# Patient Record
Sex: MALE | Race: WHITE | NOT HISPANIC OR LATINO | URBAN - METROPOLITAN AREA
[De-identification: names, ages, dates, MRNs, and addresses within clinical notes are randomized per-mention and may not be internally consistent; named-entity substitution may affect disease eponyms.]

---

## 2020-07-25 ENCOUNTER — TELEPHONE ENCOUNTER (OUTPATIENT)
Dept: URBAN - METROPOLITAN AREA CLINIC 13 | Facility: CLINIC | Age: 55
End: 2020-07-25

## 2020-07-25 RX ORDER — OMEPRAZOLE 40 MG/1
TAKE 1 CAPSULE DAILY CAPSULE, DELAYED RELEASE ORAL
Qty: 1 | Refills: 11 | OUTPATIENT
End: 2016-10-13

## 2020-07-26 ENCOUNTER — TELEPHONE ENCOUNTER (OUTPATIENT)
Dept: URBAN - METROPOLITAN AREA CLINIC 13 | Facility: CLINIC | Age: 55
End: 2020-07-26

## 2020-07-26 RX ORDER — ALPRAZOLAM 0.5 MG
TAKE 1 TABLET DAILY TABLET ORAL
Refills: 0 | Status: ACTIVE | COMMUNITY

## 2021-08-25 ENCOUNTER — WEB ENCOUNTER (OUTPATIENT)
Dept: URBAN - METROPOLITAN AREA CLINIC 113 | Facility: CLINIC | Age: 56
End: 2021-08-25

## 2021-08-25 ENCOUNTER — DASHBOARD ENCOUNTERS (OUTPATIENT)
Age: 56
End: 2021-08-25

## 2021-08-25 ENCOUNTER — OFFICE VISIT (OUTPATIENT)
Dept: URBAN - METROPOLITAN AREA CLINIC 113 | Facility: CLINIC | Age: 56
End: 2021-08-25
Payer: COMMERCIAL

## 2021-08-25 VITALS
WEIGHT: 195 LBS | TEMPERATURE: 97.7 F | BODY MASS INDEX: 27.3 KG/M2 | DIASTOLIC BLOOD PRESSURE: 80 MMHG | HEART RATE: 70 BPM | SYSTOLIC BLOOD PRESSURE: 115 MMHG | HEIGHT: 71 IN

## 2021-08-25 DIAGNOSIS — D36.9 TUBULAR ADENOMA: ICD-10-CM

## 2021-08-25 DIAGNOSIS — K57.30 ACQUIRED DIVERTICULOSIS OF COLON: ICD-10-CM

## 2021-08-25 DIAGNOSIS — K64.1 GRADE II HEMORRHOIDS: ICD-10-CM

## 2021-08-25 PROCEDURE — 99244 OFF/OP CNSLTJ NEW/EST MOD 40: CPT | Performed by: INTERNAL MEDICINE

## 2021-08-25 PROCEDURE — 99204 OFFICE O/P NEW MOD 45 MIN: CPT | Performed by: INTERNAL MEDICINE

## 2021-08-25 RX ORDER — SODIUM, POTASSIUM,MAG SULFATES 17.5-3.13G
354 ML SOLUTION, RECONSTITUTED, ORAL ORAL ONCE
Qty: 354 ML | Refills: 0 | OUTPATIENT
Start: 2021-08-25 | End: 2021-08-26

## 2021-08-25 RX ORDER — ALPRAZOLAM 0.5 MG
TAKE 1 TABLET DAILY TABLET ORAL
Refills: 0 | Status: ON HOLD | COMMUNITY

## 2021-08-25 RX ORDER — BUPROPION HYDROCHLORIDE 150 MG/1
1 TABLET IN THE MORNING TABLET, EXTENDED RELEASE ORAL ONCE A DAY
Status: ACTIVE | COMMUNITY

## 2021-08-25 RX ORDER — BUPROPION HYDROCHLORIDE 75 MG/1
1 TABLET TABLET ORAL TWICE A DAY
Status: ON HOLD | COMMUNITY

## 2021-08-25 NOTE — HPI-TODAY'S VISIT:
The patient was referred by Dr. Radha Browning for colon cancer screening.   A copy of this document is being forwarded to the referring provider.  55-year-old male presenting as an initial evaluation.  He was referred for colon cancer screening.  He was previously seen in our clinic in June 2016.  He did have a colonoscopy performed on June 6, 2016 for rectal bleeding and change in bowel habits.  He is found to have a 2 mm tubular adenoma, sigmoid diverticulosis, and nonbleeding internal hemorrhoids.  He also had an upper endoscopy performed on November 1, 2016 for reflux.  He was found have a normal esophagus, irregular Z line, mild gastritis and mild duodenitis. He has not been seen since then. Patient is without any abdominal complaints. No dysphagia, heartburn, regurgitation, unintentional weight loss, nausea, vomiting, hematemesis or melena. Bowels are moving regularly without blood per rectum. No complaints of bloating. No jaundice, icterus.

## 2021-08-27 PROBLEM — 397881000: Status: ACTIVE | Noted: 2021-08-25

## 2021-09-07 ENCOUNTER — CLAIMS CREATED FROM THE CLAIM WINDOW (OUTPATIENT)
Dept: URBAN - METROPOLITAN AREA CLINIC 4 | Facility: CLINIC | Age: 56
End: 2021-09-07
Payer: COMMERCIAL

## 2021-09-07 ENCOUNTER — OFFICE VISIT (OUTPATIENT)
Dept: URBAN - METROPOLITAN AREA SURGERY CENTER 25 | Facility: SURGERY CENTER | Age: 56
End: 2021-09-07
Payer: COMMERCIAL

## 2021-09-07 DIAGNOSIS — D12.4 BENIGN NEOPLASM OF DESCENDING COLON: ICD-10-CM

## 2021-09-07 DIAGNOSIS — D12.4 ADENOMA OF DESCENDING COLON: ICD-10-CM

## 2021-09-07 DIAGNOSIS — D12.0 ADENOMA OF CECUM: ICD-10-CM

## 2021-09-07 DIAGNOSIS — D12.0 BENIGN NEOPLASM OF CECUM: ICD-10-CM

## 2021-09-07 DIAGNOSIS — Z86.010 ADENOMAS PERSONAL HISTORY OF COLONIC POLYPS: ICD-10-CM

## 2021-09-07 PROCEDURE — 45385 COLONOSCOPY W/LESION REMOVAL: CPT | Performed by: INTERNAL MEDICINE

## 2021-09-07 PROCEDURE — G8907 PT DOC NO EVENTS ON DISCHARG: HCPCS | Performed by: INTERNAL MEDICINE

## 2021-09-07 PROCEDURE — 88305 TISSUE EXAM BY PATHOLOGIST: CPT | Performed by: PATHOLOGY

## 2021-09-07 RX ORDER — BUPROPION HYDROCHLORIDE 150 MG/1
1 TABLET IN THE MORNING TABLET, EXTENDED RELEASE ORAL ONCE A DAY
Status: ACTIVE | COMMUNITY

## 2021-09-07 RX ORDER — BUPROPION HYDROCHLORIDE 75 MG/1
1 TABLET TABLET ORAL TWICE A DAY
Status: ON HOLD | COMMUNITY

## 2021-09-07 RX ORDER — ALPRAZOLAM 0.5 MG
TAKE 1 TABLET DAILY TABLET ORAL
Refills: 0 | Status: ON HOLD | COMMUNITY

## 2021-10-14 ENCOUNTER — OFFICE VISIT (OUTPATIENT)
Dept: URBAN - METROPOLITAN AREA CLINIC 113 | Facility: CLINIC | Age: 56
End: 2021-10-14

## 2021-10-14 RX ORDER — BUPROPION HYDROCHLORIDE 150 MG/1
1 TABLET IN THE MORNING TABLET, EXTENDED RELEASE ORAL ONCE A DAY
Status: ACTIVE | COMMUNITY

## 2021-10-14 RX ORDER — BUPROPION HYDROCHLORIDE 75 MG/1
1 TABLET TABLET ORAL TWICE A DAY
Status: ON HOLD | COMMUNITY

## 2021-10-14 RX ORDER — ALPRAZOLAM 0.5 MG
TAKE 1 TABLET DAILY TABLET ORAL
Refills: 0 | Status: ON HOLD | COMMUNITY

## 2021-10-14 NOTE — HPI-TODAY'S VISIT:
This is a 55-year-old male with a history of GERD, hemorrhoids, diverticulosis, and adenomatous colon polyps presenting for follow-up after a surveillance colonoscopy. Colonoscopy 9/7/2021:BBPS 9, grade 2 nonbleeding internal hemorrhoids, removal of 2 descending and cecal 6 to 8 mm sessile polyps.  The cecal polyp was reported as sessile serrated adenoma in the descending polyp was reported as a tubular adenoma.  Surveillance colonoscopy recommended in 2026.